# Patient Record
Sex: FEMALE | Race: WHITE | NOT HISPANIC OR LATINO | URBAN - METROPOLITAN AREA
[De-identification: names, ages, dates, MRNs, and addresses within clinical notes are randomized per-mention and may not be internally consistent; named-entity substitution may affect disease eponyms.]

---

## 2021-09-22 ENCOUNTER — OFFICE VISIT (OUTPATIENT)
Dept: URBAN - METROPOLITAN AREA CLINIC 27 | Facility: CLINIC | Age: 56
End: 2021-09-22

## 2021-09-22 PROBLEM — 62315008 DIARRHEA: Status: ACTIVE | Noted: 2021-09-22

## 2021-09-22 PROBLEM — 275978004 SCREENING FOR MALIGNANT NEOPLASM OF COLON: Status: ACTIVE | Noted: 2021-09-22

## 2021-11-08 ENCOUNTER — OFFICE VISIT (OUTPATIENT)
Dept: URBAN - METROPOLITAN AREA SURGERY CENTER 7 | Facility: SURGERY CENTER | Age: 56
End: 2021-11-08

## 2022-04-30 ENCOUNTER — TELEPHONE ENCOUNTER (OUTPATIENT)
Dept: URBAN - METROPOLITAN AREA CLINIC 121 | Facility: CLINIC | Age: 57
End: 2022-04-30

## 2022-05-01 ENCOUNTER — TELEPHONE ENCOUNTER (OUTPATIENT)
Dept: URBAN - METROPOLITAN AREA CLINIC 121 | Facility: CLINIC | Age: 57
End: 2022-05-01

## 2024-09-23 ENCOUNTER — TELEPHONE ENCOUNTER (OUTPATIENT)
Dept: URBAN - METROPOLITAN AREA CLINIC 27 | Facility: CLINIC | Age: 59
End: 2024-09-23

## 2024-09-23 ENCOUNTER — OFFICE VISIT (OUTPATIENT)
Dept: URBAN - METROPOLITAN AREA CLINIC 27 | Facility: CLINIC | Age: 59
End: 2024-09-23
Payer: COMMERCIAL

## 2024-09-23 VITALS
WEIGHT: 230 LBS | SYSTOLIC BLOOD PRESSURE: 132 MMHG | BODY MASS INDEX: 34.86 KG/M2 | HEART RATE: 63 BPM | HEIGHT: 68 IN | DIASTOLIC BLOOD PRESSURE: 97 MMHG

## 2024-09-23 DIAGNOSIS — R19.7 DIARRHEA, UNSPECIFIED: ICD-10-CM

## 2024-09-23 PROCEDURE — 99214 OFFICE O/P EST MOD 30 MIN: CPT | Performed by: INTERNAL MEDICINE

## 2024-09-23 RX ORDER — HYDROXYCHLOROQUINE SULFATE 200 MG/1
TAKE ONE TABLET BY MOUTH TWICE A DAY TABLET, FILM COATED ORAL
Qty: 180 UNSPECIFIED | Refills: 0 | Status: ON HOLD | COMMUNITY

## 2024-09-23 RX ORDER — DESONIDE 0.5 MG/G
APPLY A THIN LATER TOPICALLY TO FULL FACE TWICE A DAY FOR 4 WEEKS CREAM TOPICAL
Qty: 60 UNSPECIFIED | Refills: 0 | Status: ON HOLD | COMMUNITY

## 2024-09-23 RX ORDER — FOLIC ACID 1 MG/1
TAKE ONE TABLET BY MOUTH ONE TIME DAILY TABLET ORAL
Qty: 90 UNSPECIFIED | Refills: 0 | Status: ON HOLD | COMMUNITY

## 2024-09-23 RX ORDER — METHOTREXATE 25 MG/ML
INJECTION, SOLUTION INTRA-ARTERIAL; INTRAMUSCULAR; INTRATHECAL; INTRAVENOUS
Qty: 8 MILLILITER | Status: ON HOLD | COMMUNITY

## 2024-09-23 RX ORDER — ESCITALOPRAM 10 MG/1
TABLET, FILM COATED ORAL
Qty: 90 TABLET | Status: ON HOLD | COMMUNITY

## 2024-09-23 RX ORDER — HYDROXYCHLOROQUINE SULFATE 200 MG/1
TABLET, FILM COATED ORAL
Qty: 180 TABLET | Status: ON HOLD | COMMUNITY

## 2024-09-23 RX ORDER — PREDNISONE 5 MG/1
TABLET ORAL
Qty: 15 TABLET | Status: ON HOLD | COMMUNITY

## 2024-09-23 RX ORDER — ESCITALOPRAM 10 MG/1
TAKE ONE TABLET BY MOUTH ONE TIME DAILY TABLET, FILM COATED ORAL
Qty: 90 UNSPECIFIED | Refills: 0 | Status: ON HOLD | COMMUNITY

## 2024-09-23 RX ORDER — PREDNISONE 5 MG/1
TAKE TWO TABLETS BY MOUTH ONE TIME DAILY FOR 5 DAYS , THEN TAKE ONE TABLET BY MOUTH ONE TIME DAILY FOR 5 DAYS TABLET ORAL
Qty: 15 UNSPECIFIED | Refills: 0 | Status: ON HOLD | COMMUNITY

## 2024-09-23 RX ORDER — DESONIDE 0.5 MG/G
CREAM TOPICAL
Qty: 60 GRAM | Status: ON HOLD | COMMUNITY

## 2024-09-23 RX ORDER — METRONIDAZOLE 7.5 MG/G
LOTION TOPICAL
Qty: 59 MILLILITER | Status: ON HOLD | COMMUNITY

## 2024-09-23 RX ORDER — LEVOTHYROXINE SODIUM 137 UG/1
TAKE ONE TABLET BY MOUTH EVERY MORNING ON AN EMPTY STOMACH TABLET ORAL
Qty: 10 UNSPECIFIED | Refills: 0 | Status: ON HOLD | COMMUNITY

## 2024-09-23 RX ORDER — LEFLUNOMIDE 10 MG/1
TAKE ONE TABLET BY MOUTH ONCE A DAY FOR THIRTY DAYS TABLET, FILM COATED ORAL
Qty: 30 UNSPECIFIED | Refills: 0 | Status: ACTIVE | COMMUNITY

## 2024-09-23 RX ORDER — TRIAMCINOLONE ACETONIDE 1 MG/G
CREAM TOPICAL
Qty: 454 GRAM | Status: ON HOLD | COMMUNITY

## 2024-09-23 RX ORDER — TRIAMCINOLONE ACETONIDE 1 MG/G
APPLY TO ITCHY AREAS UNDER MOISTURIZER TWO TIMES A DAY CREAM TOPICAL
Qty: 454 UNSPECIFIED | Refills: 0 | Status: ACTIVE | COMMUNITY

## 2024-09-23 RX ORDER — METHOTREXATE 25 MG/ML
INJECT 0.6ML SUBCUTANEOUSLY ONCE PER WEEK SINGLE USE VIALS INJECTION, SOLUTION INTRA-ARTERIAL; INTRAMUSCULAR; INTRATHECAL; INTRAVENOUS
Qty: 8 UNSPECIFIED | Refills: 0 | Status: ON HOLD | COMMUNITY

## 2024-09-23 RX ORDER — LEVOTHYROXINE SODIUM 137 UG/1
TABLET ORAL
Qty: 84 TABLET | Status: ACTIVE | COMMUNITY

## 2024-09-23 RX ORDER — METRONIDAZOLE 7.5 MG/G
APPLY ON RED AREAS OF FACE TWICE A DAY EVERY DAY LOTION TOPICAL
Qty: 59 UNSPECIFIED | Refills: 0 | Status: ACTIVE | COMMUNITY

## 2024-09-23 RX ORDER — FOLIC ACID 1 MG/1
TABLET ORAL
Qty: 90 TABLET | Status: ON HOLD | COMMUNITY

## 2024-09-23 NOTE — HPI-TODAY'S VISIT:
This is a 58-year-old female seen in consultation at the request of Dr. Nguyen for her incontinence.  She states she has a history of food allergies and is also lactose intolerance.  She has arthritis and takes Plaquenil she has had intermittent anal incontinence for about a year she says occurs all the time sometimes she will have solid bowel movements and then looser stools.  She has been gaining weight.  No history of episiotomy.  She takes Synthroid she had a colonoscopy in 2021 that was normal.  She feels that the symptoms are progressive interfering with her daily quality of life and cause a lot of anxiety

## 2024-09-24 ENCOUNTER — LAB OUTSIDE AN ENCOUNTER (OUTPATIENT)
Dept: URBAN - METROPOLITAN AREA CLINIC 27 | Facility: CLINIC | Age: 59
End: 2024-09-24

## 2024-09-24 ENCOUNTER — DASHBOARD ENCOUNTERS (OUTPATIENT)
Age: 59
End: 2024-09-24

## 2024-09-24 LAB
IMMUNOGLOBULIN A: 382
INTERPRETATION: (no result)
TISSUE TRANSGLUTAMINASE AB, IGA: <1

## 2024-09-26 ENCOUNTER — OFFICE VISIT (OUTPATIENT)
Dept: URBAN - METROPOLITAN AREA SURGERY CENTER 7 | Facility: SURGERY CENTER | Age: 59
End: 2024-09-26

## 2024-09-26 RX ORDER — PREDNISONE 5 MG/1
TAKE TWO TABLETS BY MOUTH ONE TIME DAILY FOR 5 DAYS , THEN TAKE ONE TABLET BY MOUTH ONE TIME DAILY FOR 5 DAYS TABLET ORAL
Qty: 15 UNSPECIFIED | Refills: 0 | Status: ON HOLD | COMMUNITY

## 2024-09-26 RX ORDER — METRONIDAZOLE 7.5 MG/G
LOTION TOPICAL
Qty: 59 MILLILITER | Status: ON HOLD | COMMUNITY

## 2024-09-26 RX ORDER — PREDNISONE 5 MG/1
TABLET ORAL
Qty: 15 TABLET | Status: ON HOLD | COMMUNITY

## 2024-09-26 RX ORDER — HYDROXYCHLOROQUINE SULFATE 200 MG/1
TAKE ONE TABLET BY MOUTH TWICE A DAY TABLET, FILM COATED ORAL
Qty: 180 UNSPECIFIED | Refills: 0 | Status: ON HOLD | COMMUNITY

## 2024-09-26 RX ORDER — ESCITALOPRAM 10 MG/1
TAKE ONE TABLET BY MOUTH ONE TIME DAILY TABLET, FILM COATED ORAL
Qty: 90 UNSPECIFIED | Refills: 0 | Status: ON HOLD | COMMUNITY

## 2024-09-26 RX ORDER — FOLIC ACID 1 MG/1
TABLET ORAL
Qty: 90 TABLET | Status: ON HOLD | COMMUNITY

## 2024-09-26 RX ORDER — HYDROXYCHLOROQUINE SULFATE 200 MG/1
TABLET, FILM COATED ORAL
Qty: 180 TABLET | Status: ON HOLD | COMMUNITY

## 2024-09-26 RX ORDER — METRONIDAZOLE 7.5 MG/G
APPLY ON RED AREAS OF FACE TWICE A DAY EVERY DAY LOTION TOPICAL
Qty: 59 UNSPECIFIED | Refills: 0 | Status: ACTIVE | COMMUNITY

## 2024-09-26 RX ORDER — METHOTREXATE 25 MG/ML
INJECTION, SOLUTION INTRA-ARTERIAL; INTRAMUSCULAR; INTRATHECAL; INTRAVENOUS
Qty: 8 MILLILITER | Status: ON HOLD | COMMUNITY

## 2024-09-26 RX ORDER — DESONIDE 0.5 MG/G
CREAM TOPICAL
Qty: 60 GRAM | Status: ON HOLD | COMMUNITY

## 2024-09-26 RX ORDER — LEVOTHYROXINE SODIUM 137 UG/1
TABLET ORAL
Qty: 84 TABLET | Status: ACTIVE | COMMUNITY

## 2024-09-26 RX ORDER — LEFLUNOMIDE 10 MG/1
TAKE ONE TABLET BY MOUTH ONCE A DAY FOR THIRTY DAYS TABLET, FILM COATED ORAL
Qty: 30 UNSPECIFIED | Refills: 0 | Status: ACTIVE | COMMUNITY

## 2024-09-26 RX ORDER — METHOTREXATE 25 MG/ML
INJECT 0.6ML SUBCUTANEOUSLY ONCE PER WEEK SINGLE USE VIALS INJECTION, SOLUTION INTRA-ARTERIAL; INTRAMUSCULAR; INTRATHECAL; INTRAVENOUS
Qty: 8 UNSPECIFIED | Refills: 0 | Status: ON HOLD | COMMUNITY

## 2024-09-26 RX ORDER — ESCITALOPRAM 10 MG/1
TABLET, FILM COATED ORAL
Qty: 90 TABLET | Status: ON HOLD | COMMUNITY

## 2024-09-26 RX ORDER — TRIAMCINOLONE ACETONIDE 1 MG/G
CREAM TOPICAL
Qty: 454 GRAM | Status: ON HOLD | COMMUNITY

## 2024-09-26 RX ORDER — TRIAMCINOLONE ACETONIDE 1 MG/G
APPLY TO ITCHY AREAS UNDER MOISTURIZER TWO TIMES A DAY CREAM TOPICAL
Qty: 454 UNSPECIFIED | Refills: 0 | Status: ACTIVE | COMMUNITY

## 2024-09-26 RX ORDER — FOLIC ACID 1 MG/1
TAKE ONE TABLET BY MOUTH ONE TIME DAILY TABLET ORAL
Qty: 90 UNSPECIFIED | Refills: 0 | Status: ON HOLD | COMMUNITY

## 2024-09-26 RX ORDER — LEVOTHYROXINE SODIUM 137 UG/1
TAKE ONE TABLET BY MOUTH EVERY MORNING ON AN EMPTY STOMACH TABLET ORAL
Qty: 10 UNSPECIFIED | Refills: 0 | Status: ON HOLD | COMMUNITY

## 2024-09-26 RX ORDER — DESONIDE 0.5 MG/G
APPLY A THIN LATER TOPICALLY TO FULL FACE TWICE A DAY FOR 4 WEEKS CREAM TOPICAL
Qty: 60 UNSPECIFIED | Refills: 0 | Status: ON HOLD | COMMUNITY

## 2024-10-11 ENCOUNTER — WEB ENCOUNTER (OUTPATIENT)
Dept: URBAN - METROPOLITAN AREA CLINIC 27 | Facility: CLINIC | Age: 59
End: 2024-10-11